# Patient Record
Sex: MALE | Race: WHITE | NOT HISPANIC OR LATINO | URBAN - METROPOLITAN AREA
[De-identification: names, ages, dates, MRNs, and addresses within clinical notes are randomized per-mention and may not be internally consistent; named-entity substitution may affect disease eponyms.]

---

## 2023-12-12 RX ORDER — SODIUM CHLORIDE 9 MG/ML
1000 INJECTION, SOLUTION INTRAVENOUS
Refills: 0 | Status: DISCONTINUED | OUTPATIENT
Start: 2023-12-13 | End: 2023-12-13

## 2023-12-12 NOTE — ASU PATIENT PROFILE, ADULT - NS PREOP UNDERSTANDS INFO
spoke to patient to be NPO/NO solid food  after 2200 pm tonight. allow to drink water  or apple juice till  12Mn,  dress comfortable,   leave all valuable at home ,  bring ID photo and insurance cards,  escort arranged, address and  telephone given to  patient/yes

## 2023-12-12 NOTE — ASU PATIENT PROFILE, ADULT - PRESSURE ULCER(S)
Caller: Patient   Details of condition: Patient is newly pregnant (LMP 7/8). She had recently completed clomid and wonders if she needs to be on a supplemental suppository now that she is pregnant since her latest levels were a bit low. Please advise.  Pharmacy verified? No  Appointment offered?  Yes  Best time to reach patient: anytime today  Patient would like a call back - try her cell first, if she does not answer call her at her work number [phone].               no

## 2023-12-12 NOTE — ASU PATIENT PROFILE, ADULT - FALL HARM RISK - HARM RISK INTERVENTIONS
Communicate Risk of Fall with Harm to all staff/Reinforce activity limits and safety measures with patient and family/Tailored Fall Risk Interventions/Visual Cue: Yellow wristband and red socks/Bed in lowest position, wheels locked, appropriate side rails in place/Call bell, personal items and telephone in reach/Instruct patient to call for assistance before getting out of bed or chair/Non-slip footwear when patient is out of bed/[place] to call system/Physically safe environment - no spills, clutter or unnecessary equipment/Purposeful Proactive Rounding/Room/bathroom lighting operational, light cord in reach Communicate Risk of Fall with Harm to all staff/Reinforce activity limits and safety measures with patient and family/Tailored Fall Risk Interventions/Visual Cue: Yellow wristband and red socks/Bed in lowest position, wheels locked, appropriate side rails in place/Call bell, personal items and telephone in reach/Instruct patient to call for assistance before getting out of bed or chair/Non-slip footwear when patient is out of bed/[place] to call system/Physically safe environment - no spills, clutter or unnecessary equipment/Purposeful Proactive Rounding/Room/bathroom lighting operational, light cord in reach

## 2023-12-12 NOTE — ASU PATIENT PROFILE, ADULT - NSICDXPASTMEDICALHX_GEN_ALL_CORE_FT
PAST MEDICAL HISTORY:  Chronic kidney disease (CKD)     GERD (gastroesophageal reflux disease)     Hyperlipidemia      PAST MEDICAL HISTORY:  Anemia     Chronic kidney disease (CKD)     GERD (gastroesophageal reflux disease)     HTN (hypertension)     Hyperlipidemia     Nephrolithiasis     Skin cancer

## 2023-12-12 NOTE — ASU PATIENT PROFILE, ADULT - NSICDXPASTSURGICALHX_GEN_ALL_CORE_FT
PAST SURGICAL HISTORY:  No significant past surgical history PAST SURGICAL HISTORY:  H/O colonoscopy with polypectomy     History of skin cancer excision left arm    History of surgery repair of esophageal bleeding

## 2023-12-13 ENCOUNTER — OUTPATIENT (OUTPATIENT)
Dept: OUTPATIENT SERVICES | Facility: HOSPITAL | Age: 73
LOS: 1 days | Discharge: HOME | End: 2023-12-13

## 2023-12-13 VITALS
RESPIRATION RATE: 16 BRPM | SYSTOLIC BLOOD PRESSURE: 160 MMHG | OXYGEN SATURATION: 96 % | TEMPERATURE: 97 F | DIASTOLIC BLOOD PRESSURE: 76 MMHG | HEART RATE: 52 BPM

## 2023-12-13 VITALS
OXYGEN SATURATION: 97 % | RESPIRATION RATE: 16 BRPM | DIASTOLIC BLOOD PRESSURE: 82 MMHG | HEART RATE: 57 BPM | SYSTOLIC BLOOD PRESSURE: 172 MMHG | HEIGHT: 66.5 IN | TEMPERATURE: 97 F | WEIGHT: 146.61 LBS

## 2023-12-13 DIAGNOSIS — Z87.19 PERSONAL HISTORY OF OTHER DISEASES OF THE DIGESTIVE SYSTEM: Chronic | ICD-10-CM

## 2023-12-13 DIAGNOSIS — Z85.828 PERSONAL HISTORY OF OTHER MALIGNANT NEOPLASM OF SKIN: Chronic | ICD-10-CM

## 2023-12-13 DIAGNOSIS — Z98.890 OTHER SPECIFIED POSTPROCEDURAL STATES: Chronic | ICD-10-CM

## 2023-12-13 DEVICE — IMPLANTABLE DEVICE
Type: IMPLANTABLE DEVICE | Site: RIGHT | Status: NON-FUNCTIONAL
Removed: 2023-12-13

## 2023-12-13 RX ORDER — PHENYLEPHRINE HCL 2.5 %
1 DROPS OPHTHALMIC (EYE)
Refills: 0 | Status: COMPLETED | OUTPATIENT
Start: 2023-12-13 | End: 2023-12-13

## 2023-12-13 RX ORDER — PHENYLEPHRINE HCL 2.5 %
1 DROPS OPHTHALMIC (EYE) ONCE
Refills: 0 | Status: COMPLETED | OUTPATIENT
Start: 2023-12-13 | End: 2023-12-13

## 2023-12-13 RX ORDER — FUROSEMIDE 40 MG
4 TABLET ORAL
Refills: 0 | DISCHARGE

## 2023-12-13 RX ORDER — TROPICAMIDE 1 %
1 DROPS OPHTHALMIC (EYE)
Refills: 0 | Status: COMPLETED | OUTPATIENT
Start: 2023-12-13 | End: 2023-12-13

## 2023-12-13 RX ORDER — KETOROLAC TROMETHAMINE 0.5 %
1 DROPS OPHTHALMIC (EYE)
Refills: 0 | Status: COMPLETED | OUTPATIENT
Start: 2023-12-13 | End: 2023-12-13

## 2023-12-13 RX ORDER — ACETAMINOPHEN 500 MG
650 TABLET ORAL ONCE
Refills: 0 | Status: DISCONTINUED | OUTPATIENT
Start: 2023-12-13 | End: 2023-12-13

## 2023-12-13 RX ORDER — OFLOXACIN 0.3 %
1 DROPS OPHTHALMIC (EYE)
Refills: 0 | Status: COMPLETED | OUTPATIENT
Start: 2023-12-13 | End: 2023-12-13

## 2023-12-13 RX ADMIN — Medication 1 DROP(S): at 08:40

## 2023-12-13 RX ADMIN — Medication 1 DROP(S): at 08:45

## 2023-12-13 RX ADMIN — Medication 1 DROP(S): at 08:35

## 2023-12-13 NOTE — ASU DISCHARGE PLAN (ADULT/PEDIATRIC) - NS MD DC FALL RISK RISK
For information on Fall & Injury Prevention, visit: [email or web address] OR  [email or web address] OR  https://www.cdc.gov/steadi/patient.html For information on Fall & Injury Prevention, visit: [email or web address] OR  [email or web address] OR  https://www.cdc.gov/steadi/patient.html

## 2023-12-13 NOTE — PRE-ANESTHESIA EVALUATION ADULT - NSANTHPMHFT_GEN_ALL_CORE
hx alcoholism with cirrhosis, pt reports last drink 1 month ago.  Echo in 2020 WNL per records however echo report not available.  Pt informed he needs to follow with a nephrologist poorly controlled HTN, hx alcoholism with cirrhosis, hx of esophageal varices, anemia, pt reports last drink 1 month ago.  Echo in 2020 WNL per records however echo report not available for personal review.  Pt informed he needs to follow with a PCP regularly. Pt informed his blood pressure needs to be under better control. Pt informed he also needs to also follow with a nephrologist regularly, given his known history of CKD.  H&P with labs were reviewed in detail

## 2023-12-13 NOTE — PRE-ANESTHESIA EVALUATION ADULT - NSANTHOSAYNRD_GEN_A_CORE
No. JERSON screening performed.  STOP BANG Legend: 0-2 = LOW Risk; 3-4 = INTERMEDIATE Risk; 5-8 = HIGH Risk

## 2023-12-13 NOTE — OPERATIVE REPORT - OPERATIVE RPOSRT DETAILS
Date of Procedure: 12/13/23    Pre Op Dx: 1)Cataract OD                      2)Astigmatism OD    Post Op Dx: Same    Procedure: Cataract Extraction with Femtosecond laser and Toric IOL OD    Surgeon: Cristina    Anesthesia: LMAC    Indications: Patient complains of progressive decrease in vision impairing activities of daily     living and has significant refractive astigmatism     ESTIMATED BLOOD LOSS: <1 cc    Complications: None    Procedure in detail:    Informed consent was obtained prior to admission. In the holding area, the operative eye was marked and topical antibiotic, mydriatic, and NSAID drops were administered. In the upright position, under topical anesthesia, the 3,6,9 o’clock meridians were marked at the limbus. The patient was brought to the Femtosecond laser suite and placed in the supine position. An operative timeout was observed. The laser program was reviewed and confirmed. Topical anesthetic was placed in the operative eye. The suction speculum was placed with care to center about the limbus. Suction was engaged and the well was filled with BSS. The patient was rotated under the laser and the chair elevated to submerge the REGGIE.  Once docked and locked, the anterior segment OCT was performed and all anatomical margins were reviewed. The foot pedal was depressed and the entire laser program was carried out without loss of centration or suction. The suction was turned off and the patient removed from under the laser. The patient was brought to the OR and placed in the supine position. Monitors were placed and IV sedation was given. The operative eye received topical anesthetic and was prepped and draped in the usual sterile fashion including Betadine irrigation of the conjunctival fornices and isolation of the lashes. An operative timeout was observed. A lid speculum was placed followed by additional topical anesthetic. A paracentesis was created and 1% non-preserved lidocaine was placed in the anterior chamber. The chamber was then filled with dispersive viscoelastic. A temporal near clear corneal incision was created. A continuous curvilinear capsulorhexis was created followed by hydrodissection. The lens nucleus was rotated. The phacoemulsification handpiece was then tested.  The nuclear quadrants were  and each was elevated to the level of the anterior capsule where they were emulsified, taking care to keep the ultrasound tip at or below the iris plane. Additional dispersive viscoelastic was placed to protect the corneal endothelium as needed. Coaxial I/A was then used to remove the lens cortex. The capsular bag was inflated with cohesive viscoelastic and the posterior capsule was polished. The Mastel marker was used to ink the correct axis for alignment of the toric lens. The power of the foldable IOL was confirmed. The lens was brought onto the surgical field and inspected. It was placed in the insertion cartridge and loaded into the injector. The lens was delivered into the capsular bag where it unfolded atraumatically and centered well. It was rotated to align the toricity with the limbal marks. The viscoelastic was removed from behind and in front of the lens with the I/A. BSS on a canula was used to irrigate any residual viscoelastic or nuclear pieces from the angle and to hydrate the incisions. The chamber was returned to physiologic pressure with BSS. The IOL was noted to be centered and stable in the correct axis and the lens was gently pressed posteriorly to seat it in the capsular fornix.  The wounds were tested and found to be water tight. The lid speculum was removed. The eye received topical antibiotics and a patch and shield. The patient tolerated the procedure well and went to recovery in good condition.

## 2024-02-22 PROBLEM — I10 ESSENTIAL (PRIMARY) HYPERTENSION: Chronic | Status: ACTIVE | Noted: 2023-12-13

## 2024-02-22 PROBLEM — K21.9 GASTRO-ESOPHAGEAL REFLUX DISEASE WITHOUT ESOPHAGITIS: Chronic | Status: ACTIVE | Noted: 2023-12-12

## 2024-02-22 PROBLEM — N20.0 CALCULUS OF KIDNEY: Chronic | Status: ACTIVE | Noted: 2023-12-13

## 2024-02-22 PROBLEM — D64.9 ANEMIA, UNSPECIFIED: Chronic | Status: ACTIVE | Noted: 2023-12-13

## 2024-02-22 PROBLEM — C44.90 UNSPECIFIED MALIGNANT NEOPLASM OF SKIN, UNSPECIFIED: Chronic | Status: ACTIVE | Noted: 2023-12-13

## 2024-02-22 PROBLEM — E78.5 HYPERLIPIDEMIA, UNSPECIFIED: Chronic | Status: ACTIVE | Noted: 2023-12-12

## 2024-02-22 PROBLEM — N18.9 CHRONIC KIDNEY DISEASE, UNSPECIFIED: Chronic | Status: ACTIVE | Noted: 2023-12-12

## 2024-02-23 RX ORDER — SODIUM CHLORIDE 9 MG/ML
1000 INJECTION, SOLUTION INTRAVENOUS
Refills: 0 | Status: DISCONTINUED | OUTPATIENT
Start: 2024-02-28 | End: 2024-02-28

## 2024-02-27 NOTE — ASU PATIENT PROFILE, ADULT - FALL HARM RISK - HARM RISK INTERVENTIONS

## 2024-02-27 NOTE — ASU PATIENT PROFILE, ADULT - NSICDXPASTSURGICALHX_GEN_ALL_CORE_FT
PAST SURGICAL HISTORY:  H/O colonoscopy with polypectomy     History of skin cancer excision left arm    History of surgery repair of esophageal bleeding    S/P cataract surgery right

## 2024-02-27 NOTE — ASU PATIENT PROFILE, ADULT - NSICDXPASTMEDICALHX_GEN_ALL_CORE_FT
PAST MEDICAL HISTORY:  Anemia     Chronic kidney disease (CKD)     GERD (gastroesophageal reflux disease)     HTN (hypertension)     Hyperlipidemia     Nephrolithiasis     Skin cancer

## 2024-02-28 ENCOUNTER — OUTPATIENT (OUTPATIENT)
Dept: OUTPATIENT SERVICES | Facility: HOSPITAL | Age: 74
LOS: 1 days | Discharge: HOME | End: 2024-02-28

## 2024-02-28 VITALS
HEIGHT: 66 IN | SYSTOLIC BLOOD PRESSURE: 157 MMHG | HEART RATE: 64 BPM | RESPIRATION RATE: 15 BRPM | TEMPERATURE: 98 F | OXYGEN SATURATION: 97 % | WEIGHT: 149.25 LBS | DIASTOLIC BLOOD PRESSURE: 81 MMHG

## 2024-02-28 VITALS
DIASTOLIC BLOOD PRESSURE: 77 MMHG | OXYGEN SATURATION: 95 % | RESPIRATION RATE: 16 BRPM | TEMPERATURE: 97 F | HEART RATE: 60 BPM | SYSTOLIC BLOOD PRESSURE: 154 MMHG

## 2024-02-28 DIAGNOSIS — Z98.890 OTHER SPECIFIED POSTPROCEDURAL STATES: Chronic | ICD-10-CM

## 2024-02-28 DIAGNOSIS — Z98.49 CATARACT EXTRACTION STATUS, UNSPECIFIED EYE: Chronic | ICD-10-CM

## 2024-02-28 DIAGNOSIS — Z85.828 PERSONAL HISTORY OF OTHER MALIGNANT NEOPLASM OF SKIN: Chronic | ICD-10-CM

## 2024-02-28 DEVICE — IMPLANTABLE DEVICE
Type: IMPLANTABLE DEVICE | Site: LEFT | Status: NON-FUNCTIONAL
Removed: 2024-02-28

## 2024-02-28 RX ORDER — PHENYLEPHRINE HCL 2.5 %
1 DROPS OPHTHALMIC (EYE)
Refills: 0 | Status: COMPLETED | OUTPATIENT
Start: 2024-02-28 | End: 2024-02-28

## 2024-02-28 RX ORDER — PANTOPRAZOLE SODIUM 20 MG/1
1 TABLET, DELAYED RELEASE ORAL
Refills: 0 | DISCHARGE

## 2024-02-28 RX ORDER — ROSUVASTATIN CALCIUM 5 MG/1
1 TABLET ORAL
Refills: 0 | DISCHARGE

## 2024-02-28 RX ORDER — PHENYLEPHRINE HCL 2.5 %
1 DROPS OPHTHALMIC (EYE) ONCE
Refills: 0 | Status: COMPLETED | OUTPATIENT
Start: 2024-02-28 | End: 2024-02-28

## 2024-02-28 RX ORDER — OFLOXACIN 0.3 %
1 DROPS OPHTHALMIC (EYE)
Refills: 0 | Status: COMPLETED | OUTPATIENT
Start: 2024-02-28 | End: 2024-02-28

## 2024-02-28 RX ORDER — FUROSEMIDE 40 MG
1 TABLET ORAL
Refills: 0 | DISCHARGE

## 2024-02-28 RX ORDER — TROPICAMIDE 1 %
1 DROPS OPHTHALMIC (EYE)
Refills: 0 | Status: COMPLETED | OUTPATIENT
Start: 2024-02-28 | End: 2024-02-28

## 2024-02-28 RX ORDER — NADOLOL 80 MG/1
2 TABLET ORAL
Refills: 0 | DISCHARGE

## 2024-02-28 RX ADMIN — Medication 1 DROP(S): at 07:50

## 2024-02-28 RX ADMIN — Medication 1 DROP(S): at 07:55

## 2024-02-28 RX ADMIN — Medication 1 DROP(S): at 07:44

## 2024-02-28 NOTE — OPERATIVE REPORT - OPERATIVE RPOSRT DETAILS
Date of Procedure: 2/28/24    Pre Op Dx: 1)Cataract OS                      2)Astigmatism OS    Post Op Dx: Same    Procedure: Cataract Extraction with Toric IOL and Femto laser OS    Surgeon: Cristina    Anesthesia: LMAC    Indications: Patient complains of progressive decrease in vision impairing activities of daily     living and has significant refractive astigmatism     ESTIMATED BLOOD LOSS: <1 cc    Comp: None    Procedure in detail:    Informed consent was obtained prior to admission. In the holding area, the operative eye was marked and topical antibiotic, mydriatic, and NSAID drops were administered. In the upright position, under topical anesthesia, the 3,6,9 o’clock meridians were marked at the limbus. The patient was brought to the Femtosecond laser suite and placed in the supine position. An operative timeout was observed. The laser program was reviewed and confirmed. Topical anesthetic was placed in the operative eye. The suction speculum was placed with care to center about the limbus. Suction was engaged and the well was filled with BSS. The patient was rotated under the laser and the chair elevated to submerge the REGGIE.  Once docked and locked, the anterior segment OCT was performed and all anatomical margins were reviewed. The foot pedal was depressed and the entire laser program was carried out without loss of centration or suction. The suction was turned off and the patient removed from under the laser. The patient was brought to the OR and placed in the supine position. Monitors were placed and IV sedation was given. The operative eye received topical anesthetic and was prepped and draped in the usual sterile fashion including Betadine irrigation of the conjunctival fornices and isolation of the lashes. An operative timeout was observed. A lid speculum was placed followed by additional topical anesthetic. A paracentesis was created and 1% non-preserved lidocaine was placed in the anterior chamber. The chamber was then filled with dispersive viscoelastic. A temporal near clear corneal incision was created. The capsulotomy was inspected and the anterior capsular disc was removed. There were not tags. Gentle hydrodissection was performed allowing release of trapped gas from behind the lens. The lens nucleus was rotated. The phacoemulsification handpiece was then tested.  The nuclear quadrants were  from each other and each was elevated to the level of the anterior capsule and emulsified, taking care to keep the ultrasound tip at or below the iris plane. Additional dispersive viscoelastic was placed to protect the corneal endothelium as needed. Coaxial I/A was then used to remove the lens cortex. The capsular bag was inflated with cohesive viscoelastic and the posterior capsule was polished. The Mastel marker was used to ink the correct axis for alignment of the toric lens. The power of the foldable IOL was confirmed. The lens was brought onto the surgical field and inspected. It was placed in the insertion cartridge and loaded into the injector. The lens was delivered into the capsular bag where it unfolded atraumatically and centered well. It was rotated to align the toricity with the limbal marks. The viscoelastic was removed from behind and in front of the lens with the I/A. BSS on a canula was used to irrigate any residual viscoelastic or nuclear pieces from the angle and to hydrate the incisions. The chamber was returned to physiologic pressure with BSS. The IOL was noted to be centered and stable in the correct axis and the lens was gently pressed posteriorly to seat it in the capsular fornix.  The wounds were tested and found to be water tight. The lid speculum was removed. The eye received topical antibiotics and a patch and shield. The patient tolerated the procedure well and went to recovery in good condition.

## 2024-02-28 NOTE — ASU DISCHARGE PLAN (ADULT/PEDIATRIC) - NS MD DC FALL RISK RISK
For information on Fall & Injury Prevention, visit: [email or web address] OR  [email or web address] OR  https://www.cdc.gov/steadi/patient.html

## (undated) DEVICE — CAPSULE GUARD I/A

## (undated) DEVICE — PACK ANTERIOR SEGMENT

## (undated) DEVICE — GLV 8 PROTEXIS (WHITE)

## (undated) DEVICE — CANNULA FLEX TIP 45 DEG 27GAX22MM

## (undated) DEVICE — SOL IRR BAG BSS 500ML

## (undated) DEVICE — PACK CENTURION 2.4MM

## (undated) DEVICE — Device

## (undated) DEVICE — NUCLEUS HYDRODISSECTOR PEARCE ANGLED 25G X 22MM

## (undated) DEVICE — KNIFE ALCON STANDARD FULL HANDLE 15 DEG (PINK)

## (undated) DEVICE — DRAPE MICROSCOPE KNOB COVER SMALL (2 PCS)

## (undated) DEVICE — MARKING PEN DEVON DUAL TIP W RULER

## (undated) DEVICE — SUT NYLON 10-0 12" CU-5